# Patient Record
(demographics unavailable — no encounter records)

---

## 2019-10-03 NOTE — NUR
10/03/19 1040 Alyce iHcks
ASSUMED CARE OF PATIENT FROM HAYDE WRIGHT RN
PATIENT C/O PAIN RATED AT 5/10, MEDICATED PER ORDERS.  VSS.